# Patient Record
Sex: FEMALE | Race: BLACK OR AFRICAN AMERICAN | Employment: OTHER | URBAN - METROPOLITAN AREA
[De-identification: names, ages, dates, MRNs, and addresses within clinical notes are randomized per-mention and may not be internally consistent; named-entity substitution may affect disease eponyms.]

---

## 2017-04-28 ENCOUNTER — HOSPITAL ENCOUNTER (OUTPATIENT)
Dept: OTHER | Age: 82
Discharge: HOME | End: 2017-04-28
Attending: INTERNAL MEDICINE | Admitting: INTERNAL MEDICINE

## 2017-04-28 LAB
ALBUMIN SERPL-MCNC: 3.9 GM/DL (ref 3.4–5)
ALP BLD-CCNC: 108 IU/L (ref 40–129)
ALT SERPL-CCNC: <5 U/L (ref 10–40)
ANION GAP SERPL CALCULATED.3IONS-SCNC: 15 MMOL/L (ref 4–16)
AST SERPL-CCNC: 10 IU/L (ref 15–37)
BILIRUB SERPL-MCNC: 0.4 MG/DL (ref 0–1)
BUN BLDV-MCNC: 33 MG/DL (ref 6–23)
CALCIUM SERPL-MCNC: 10.4 MG/DL (ref 8.3–10.6)
CHLORIDE BLD-SCNC: 105 MMOL/L (ref 99–110)
CO2: 27 MMOL/L (ref 21–32)
CREAT SERPL-MCNC: 1.1 MG/DL (ref 0.6–1.1)
ERYTHROCYTE SEDIMENTATION RATE: 82 MM/HR (ref 0–30)
FERRITIN: 396 NG/ML (ref 15–150)
FOLATE: >20 NG/ML (ref 3.1–17.5)
GFR AFRICAN AMERICAN: 57 ML/MIN/1.73M2
GFR NON-AFRICAN AMERICAN: 47 ML/MIN/1.73M2
GLUCOSE BLD-MCNC: 75 MG/DL (ref 70–140)
IRON: 53 UG/DL (ref 37–145)
LACTATE DEHYDROGENASE: 151 IU/L (ref 120–246)
PCT TRANSFERRIN: 18 % (ref 10–44)
POTASSIUM SERPL-SCNC: 4.4 MMOL/L (ref 3.5–5.1)
RETICULOCYTE COUNT PCT: 1.7 % (ref 0.2–2.2)
SODIUM BLD-SCNC: 147 MMOL/L (ref 135–145)
TOTAL IRON BINDING CAPACITY: 287 UG/DL (ref 250–450)
TOTAL PROTEIN: 7.4 GM/DL (ref 6.4–8.2)
TSH HIGH SENSITIVITY: 1.36 UIU/ML (ref 0.27–4.2)
UNSATURATED IRON BINDING CAPACITY: 234 UG/DL (ref 110–370)
URIC ACID: 8.2 MG/DL (ref 2.6–6)
VITAMIN B-12: >2000 PG/ML (ref 211–911)

## 2017-05-01 LAB
ALBUMIN ELP: 3.3 GM/DL (ref 3.2–5.6)
ALPHA-1-GLOBULIN: 0.3 GM/DL (ref 0.1–0.4)
ALPHA-2-GLOBULIN: 1 GM/DL (ref 0.4–1.2)
BETA GLOBULIN: 1.6 GM/DL (ref 0.5–1.3)
GAMMA GLOBULIN: 1.2 GM/DL (ref 0.5–1.6)
IMMUNOFIXATION ELECTROPHORESIS 1: NORMAL
TOTAL PROTEIN: 7.4 GM/DL (ref 6.4–8.2)

## 2017-05-04 LAB — T4 TOTAL: 8.91 UG/DL

## 2017-05-05 LAB
KAPPA QUANT FREE LIGHT CHAINS: 2.91
KAPPA/LAMBDA FREE LIGHT CHAIN RATIO: 0.31
LAMBDA FREE LIGHT CHAINS URINE/ VOL: 9.44

## 2017-05-08 ENCOUNTER — HOSPITAL ENCOUNTER (OUTPATIENT)
Dept: OTHER | Age: 82
Discharge: HOME | End: 2017-05-08
Attending: INTERNAL MEDICINE | Admitting: INTERNAL MEDICINE

## 2017-05-08 LAB
Lab: 24 HRS
PROTEIN 24 HOUR URINE: 170 MG/24 HR
VOLUME, (UVOL): 900 MLS

## 2017-05-10 LAB
ALBUMIN, U: 23 %
ALBUMIN, U: DETECTED
ALPHA 1, URINE: DETECTED
ALPHA 2, URINE: DETECTED
ALPHA-1-GLOBULIN, U: 4 %
ALPHA-2-GLOBULIN, U: 11 %
BETA GLOBULIN, U: 24 %
BETA URINE: DETECTED
FREE KAPPA LT CHAINS,UR: 9.78 MG/DL
FREE LAMBDA LT CHAINS,UR: 5.31 MG/DL
GAMMA GLOBULIN, U: 38 %
GAMMA GLOBULIN, URINE: DETECTED
IFE INTERPRETATION:U: ABNORMAL
KAPPA/LAMBDA RATIO,U: 1.84 RATIO
Lab: 24 HR
PROTEIN ELP 24 HOUR URINE: 170 MG/24 HR
PROTEIN, TOTAL URINE: 146 MG/D
URINE FREE KAPPA EXCRETION/DAY: 88.02 MG/D
URINE FREE LAMBDA EXCRETION/DAY: 47.79 MG/D
VOLUME, (UVOL): 900 ML

## 2017-09-29 ENCOUNTER — TELEPHONE (OUTPATIENT)
Dept: CARDIOLOGY CLINIC | Age: 82
End: 2017-09-29

## 2018-04-06 ENCOUNTER — HOSPITAL ENCOUNTER (OUTPATIENT)
Dept: OTHER | Age: 83
Discharge: HOME | End: 2018-04-06
Attending: FAMILY MEDICINE | Admitting: FAMILY MEDICINE

## 2018-04-06 LAB
ALBUMIN SERPL-MCNC: 3.5 GM/DL (ref 3.4–5)
ALP BLD-CCNC: 94 IU/L (ref 40–129)
ALT SERPL-CCNC: <5 U/L (ref 10–40)
ANION GAP SERPL CALCULATED.3IONS-SCNC: 11 MMOL/L (ref 4–16)
AST SERPL-CCNC: 10 IU/L (ref 15–37)
BASOPHILS ABSOLUTE: 0 K/CU MM
BASOPHILS RELATIVE PERCENT: 0.1 % (ref 0–1)
BILIRUB SERPL-MCNC: 0.4 MG/DL (ref 0–1)
BUN BLDV-MCNC: 30 MG/DL (ref 6–23)
CALCIUM SERPL-MCNC: 10.3 MG/DL (ref 8.3–10.6)
CHLORIDE BLD-SCNC: 103 MMOL/L (ref 99–110)
CO2: 28 MMOL/L (ref 21–32)
CREAT SERPL-MCNC: 0.9 MG/DL (ref 0.6–1.1)
DIFFERENTIAL TYPE: ABNORMAL
EOSINOPHILS ABSOLUTE: 0 K/CU MM
EOSINOPHILS RELATIVE PERCENT: 0.1 % (ref 0–3)
GFR AFRICAN AMERICAN: >60 ML/MIN/1.73M2
GFR NON-AFRICAN AMERICAN: 59 ML/MIN/1.73M2
GLUCOSE BLD-MCNC: 86 MG/DL (ref 70–99)
HCT VFR BLD CALC: 32.3 % (ref 37–47)
HEMOGLOBIN: 10 GM/DL (ref 12.5–16)
IMMATURE NEUTROPHIL %: 1.2 % (ref 0–0.43)
LYMPHOCYTES ABSOLUTE: 2 K/CU MM
LYMPHOCYTES RELATIVE PERCENT: 13.7 % (ref 24–44)
MCH RBC QN AUTO: 29.2 PG (ref 27–31)
MCHC RBC AUTO-ENTMCNC: 31 % (ref 32–36)
MCV RBC AUTO: 94.4 FL (ref 78–100)
MONOCYTES ABSOLUTE: 1.8 K/CU MM
MONOCYTES RELATIVE PERCENT: 12.6 % (ref 0–4)
NUCLEATED RBC %: 0 %
PDW BLD-RTO: 13.2 % (ref 11.7–14.9)
PLATELET # BLD: 205 K/CU MM (ref 140–440)
PMV BLD AUTO: 8.4 FL (ref 7.5–11.1)
POTASSIUM SERPL-SCNC: 4.1 MMOL/L (ref 3.5–5.1)
RBC # BLD: 3.42 M/CU MM (ref 4.2–5.4)
SEGMENTED NEUTROPHILS ABSOLUTE COUNT: 10.4 K/CU MM
SEGMENTED NEUTROPHILS RELATIVE PERCENT: 72.3 % (ref 36–66)
SODIUM BLD-SCNC: 142 MMOL/L (ref 135–145)
TOTAL IMMATURE NEUTOROPHIL: 0.17 K/CU MM
TOTAL NUCLEATED RBC: 0 K/CU MM
TOTAL PROTEIN: 6.9 GM/DL (ref 6.4–8.2)
TSH HIGH SENSITIVITY: 1.72 UIU/ML (ref 0.27–4.2)
VITAMIN B-12: >2000 PG/ML (ref 211–911)
WBC # BLD: 14.3 K/CU MM (ref 4–10.5)

## 2020-01-01 ENCOUNTER — HOSPITAL ENCOUNTER (OUTPATIENT)
Age: 85
Setting detail: SPECIMEN
Discharge: HOME | DRG: 208 | End: 2020-12-17
Payer: MEDICARE

## 2020-01-01 ENCOUNTER — APPOINTMENT (OUTPATIENT)
Dept: GENERAL RADIOLOGY | Age: 85
DRG: 208 | End: 2020-01-01
Payer: MEDICARE

## 2020-01-01 ENCOUNTER — HOSPITAL ENCOUNTER (EMERGENCY)
Age: 85
DRG: 208 | End: 2020-12-18
Attending: EMERGENCY MEDICINE | Admitting: INTERNAL MEDICINE
Payer: MEDICARE

## 2020-01-01 VITALS
HEIGHT: 60 IN | TEMPERATURE: 100.6 F | RESPIRATION RATE: 16 BRPM | DIASTOLIC BLOOD PRESSURE: 66 MMHG | OXYGEN SATURATION: 100 % | SYSTOLIC BLOOD PRESSURE: 93 MMHG | HEART RATE: 99 BPM | WEIGHT: 170 LBS | BODY MASS INDEX: 33.38 KG/M2

## 2020-01-01 DIAGNOSIS — U07.1 PNEUMONIA DUE TO COVID-19 VIRUS: ICD-10-CM

## 2020-01-01 DIAGNOSIS — I46.9 CARDIOPULMONARY ARREST (HCC): Primary | ICD-10-CM

## 2020-01-01 DIAGNOSIS — J12.82 PNEUMONIA DUE TO COVID-19 VIRUS: ICD-10-CM

## 2020-01-01 LAB
ALBUMIN SERPL-MCNC: 3.6 GM/DL (ref 3.4–5)
ALP BLD-CCNC: 89 IU/L (ref 40–129)
ALT SERPL-CCNC: <5 U/L (ref 10–40)
ANION GAP SERPL CALCULATED.3IONS-SCNC: 13 MMOL/L (ref 4–16)
AST SERPL-CCNC: 27 IU/L (ref 15–37)
BACTERIA: ABNORMAL /HPF
BASE EXCESS MIXED: 3.3 (ref 0–2.3)
BASE EXCESS: 12 (ref 0–2.4)
BASOPHILS ABSOLUTE: 0 K/CU MM
BASOPHILS RELATIVE PERCENT: 0.1 % (ref 0–1)
BILIRUB SERPL-MCNC: 0.9 MG/DL (ref 0–1)
BILIRUBIN URINE: NEGATIVE MG/DL
BLOOD, URINE: ABNORMAL
BUN BLDV-MCNC: 36 MG/DL (ref 6–23)
CALCIUM SERPL-MCNC: 10.3 MG/DL (ref 8.3–10.6)
CARBON MONOXIDE, BLOOD: 2 % (ref 0–5)
CHLORIDE BLD-SCNC: 101 MMOL/L (ref 99–110)
CLARITY: ABNORMAL
CO2 CONTENT: 19 MMOL/L (ref 19–24)
CO2: 26 MMOL/L (ref 21–32)
COLOR: ABNORMAL
COMMENT: ABNORMAL
COMMENT: ABNORMAL
CREAT SERPL-MCNC: 1.4 MG/DL (ref 0.6–1.1)
DIFFERENTIAL TYPE: ABNORMAL
EOSINOPHILS ABSOLUTE: 0 K/CU MM
EOSINOPHILS RELATIVE PERCENT: 0 % (ref 0–3)
GFR AFRICAN AMERICAN: 43 ML/MIN/1.73M2
GFR NON-AFRICAN AMERICAN: 35 ML/MIN/1.73M2
GLUCOSE BLD-MCNC: 92 MG/DL (ref 70–99)
GLUCOSE, URINE: NEGATIVE MG/DL
HCO3 ARTERIAL: 17.4 MMOL/L (ref 18–23)
HCO3 VENOUS: 29.1 MMOL/L (ref 19–25)
HCT VFR BLD CALC: 30.2 % (ref 37–47)
HEMOGLOBIN: 9 GM/DL (ref 12.5–16)
HYALINE CASTS: 5 /LPF
IMMATURE NEUTROPHIL %: 1.2 % (ref 0–0.43)
KETONES, URINE: ABNORMAL MG/DL
LACTATE: 1.2 MMOL/L (ref 0.4–2)
LEUKOCYTE ESTERASE, URINE: ABNORMAL
LYMPHOCYTES ABSOLUTE: 0.6 K/CU MM
LYMPHOCYTES RELATIVE PERCENT: 4.3 % (ref 24–44)
MCH RBC QN AUTO: 29 PG (ref 27–31)
MCHC RBC AUTO-ENTMCNC: 29.8 % (ref 32–36)
MCV RBC AUTO: 97.4 FL (ref 78–100)
METHEMOGLOBIN ARTERIAL: 1.1 %
MONOCYTES ABSOLUTE: 3.6 K/CU MM
MONOCYTES RELATIVE PERCENT: 24.7 % (ref 0–4)
MUCUS: ABNORMAL HPF
NITRITE URINE, QUANTITATIVE: NEGATIVE
NUCLEATED RBC %: 0 %
O2 SAT, VEN: 83.9 % (ref 50–70)
O2 SATURATION: 96.5 % (ref 96–97)
PCO2 ARTERIAL: 51 MMHG (ref 32–45)
PCO2, VEN: 48 MMHG (ref 38–52)
PDW BLD-RTO: 14.5 % (ref 11.7–14.9)
PH BLOOD: 7.14 (ref 7.34–7.45)
PH VENOUS: 7.39 (ref 7.32–7.42)
PH, URINE: 5 (ref 5–8)
PLATELET # BLD: 184 K/CU MM (ref 140–440)
PMV BLD AUTO: 10.4 FL (ref 7.5–11.1)
PO2 ARTERIAL: 176 MMHG (ref 75–100)
PO2, VEN: 55 MMHG (ref 28–48)
POTASSIUM SERPL-SCNC: 4.3 MMOL/L (ref 3.5–5.1)
PRO-BNP: 8225 PG/ML
PROTEIN UA: 30 MG/DL
RBC # BLD: 3.1 M/CU MM (ref 4.2–5.4)
RBC URINE: 14 /HPF (ref 0–6)
SARS-COV-2, NAAT: DETECTED
SEGMENTED NEUTROPHILS ABSOLUTE COUNT: 10.3 K/CU MM
SEGMENTED NEUTROPHILS RELATIVE PERCENT: 69.7 % (ref 36–66)
SODIUM BLD-SCNC: 140 MMOL/L (ref 135–145)
SOURCE: ABNORMAL
SPECIFIC GRAVITY UA: 1.02 (ref 1–1.03)
SQUAMOUS EPITHELIAL: 13 /HPF
TOTAL IMMATURE NEUTOROPHIL: 0.17 K/CU MM
TOTAL NUCLEATED RBC: 0 K/CU MM
TOTAL PROTEIN: 7.5 GM/DL (ref 6.4–8.2)
TRICHOMONAS: ABNORMAL /HPF
TROPONIN T: 0.13 NG/ML
UROBILINOGEN, URINE: NORMAL MG/DL (ref 0.2–1)
WBC # BLD: 14.7 K/CU MM (ref 4–10.5)
WBC UA: 33 /HPF (ref 0–5)

## 2020-01-01 PROCEDURE — 82805 BLOOD GASES W/O2 SATURATION: CPT

## 2020-01-01 PROCEDURE — 2700000000 HC OXYGEN THERAPY PER DAY

## 2020-01-01 PROCEDURE — 71045 X-RAY EXAM CHEST 1 VIEW: CPT

## 2020-01-01 PROCEDURE — 85025 COMPLETE CBC W/AUTO DIFF WBC: CPT

## 2020-01-01 PROCEDURE — 87086 URINE CULTURE/COLONY COUNT: CPT

## 2020-01-01 PROCEDURE — 87186 SC STD MICRODIL/AGAR DIL: CPT

## 2020-01-01 PROCEDURE — 83880 ASSAY OF NATRIURETIC PEPTIDE: CPT

## 2020-01-01 PROCEDURE — 93010 ELECTROCARDIOGRAM REPORT: CPT | Performed by: INTERNAL MEDICINE

## 2020-01-01 PROCEDURE — 99291 CRITICAL CARE FIRST HOUR: CPT

## 2020-01-01 PROCEDURE — 82803 BLOOD GASES ANY COMBINATION: CPT

## 2020-01-01 PROCEDURE — 96375 TX/PRO/DX INJ NEW DRUG ADDON: CPT

## 2020-01-01 PROCEDURE — 2500000003 HC RX 250 WO HCPCS: Performed by: EMERGENCY MEDICINE

## 2020-01-01 PROCEDURE — U0002 COVID-19 LAB TEST NON-CDC: HCPCS

## 2020-01-01 PROCEDURE — 87077 CULTURE AEROBIC IDENTIFY: CPT

## 2020-01-01 PROCEDURE — 5A12012 PERFORMANCE OF CARDIAC OUTPUT, SINGLE, MANUAL: ICD-10-PCS | Performed by: EMERGENCY MEDICINE

## 2020-01-01 PROCEDURE — 5A1935Z RESPIRATORY VENTILATION, LESS THAN 24 CONSECUTIVE HOURS: ICD-10-PCS | Performed by: EMERGENCY MEDICINE

## 2020-01-01 PROCEDURE — 6360000002 HC RX W HCPCS

## 2020-01-01 PROCEDURE — 2500000003 HC RX 250 WO HCPCS

## 2020-01-01 PROCEDURE — 83605 ASSAY OF LACTIC ACID: CPT

## 2020-01-01 PROCEDURE — 31500 INSERT EMERGENCY AIRWAY: CPT

## 2020-01-01 PROCEDURE — 84484 ASSAY OF TROPONIN QUANT: CPT

## 2020-01-01 PROCEDURE — 92950 HEART/LUNG RESUSCITATION CPR: CPT

## 2020-01-01 PROCEDURE — 80053 COMPREHEN METABOLIC PANEL: CPT

## 2020-01-01 PROCEDURE — 93005 ELECTROCARDIOGRAM TRACING: CPT | Performed by: EMERGENCY MEDICINE

## 2020-01-01 PROCEDURE — 87040 BLOOD CULTURE FOR BACTERIA: CPT

## 2020-01-01 PROCEDURE — 2580000003 HC RX 258: Performed by: EMERGENCY MEDICINE

## 2020-01-01 PROCEDURE — 96365 THER/PROPH/DIAG IV INF INIT: CPT

## 2020-01-01 PROCEDURE — 6360000002 HC RX W HCPCS: Performed by: EMERGENCY MEDICINE

## 2020-01-01 PROCEDURE — 3E043XZ INTRODUCTION OF VASOPRESSOR INTO CENTRAL VEIN, PERCUTANEOUS APPROACH: ICD-10-PCS | Performed by: EMERGENCY MEDICINE

## 2020-01-01 PROCEDURE — 36600 WITHDRAWAL OF ARTERIAL BLOOD: CPT

## 2020-01-01 PROCEDURE — 94761 N-INVAS EAR/PLS OXIMETRY MLT: CPT

## 2020-01-01 PROCEDURE — 6370000000 HC RX 637 (ALT 250 FOR IP): Performed by: EMERGENCY MEDICINE

## 2020-01-01 PROCEDURE — 2580000003 HC RX 258

## 2020-01-01 PROCEDURE — 0BH18EZ INSERTION OF ENDOTRACHEAL AIRWAY INTO TRACHEA, VIA NATURAL OR ARTIFICIAL OPENING ENDOSCOPIC: ICD-10-PCS | Performed by: EMERGENCY MEDICINE

## 2020-01-01 PROCEDURE — 99285 EMERGENCY DEPT VISIT HI MDM: CPT

## 2020-01-01 PROCEDURE — 81001 URINALYSIS AUTO W/SCOPE: CPT

## 2020-01-01 PROCEDURE — 96374 THER/PROPH/DIAG INJ IV PUSH: CPT

## 2020-01-01 PROCEDURE — 1200000000 HC SEMI PRIVATE

## 2020-01-01 PROCEDURE — 05HP33Z INSERTION OF INFUSION DEVICE INTO RIGHT EXTERNAL JUGULAR VEIN, PERCUTANEOUS APPROACH: ICD-10-PCS | Performed by: EMERGENCY MEDICINE

## 2020-01-01 RX ORDER — ONDANSETRON 2 MG/ML
4 INJECTION INTRAMUSCULAR; INTRAVENOUS EVERY 6 HOURS PRN
Status: CANCELLED | OUTPATIENT
Start: 2020-01-01

## 2020-01-01 RX ORDER — DEXTROSE MONOHYDRATE 25 G/50ML
INJECTION, SOLUTION INTRAVENOUS DAILY PRN
Status: COMPLETED | OUTPATIENT
Start: 2020-01-01 | End: 2020-01-01

## 2020-01-01 RX ORDER — METHYLPREDNISOLONE SODIUM SUCCINATE 125 MG/2ML
40 INJECTION, POWDER, LYOPHILIZED, FOR SOLUTION INTRAMUSCULAR; INTRAVENOUS EVERY 12 HOURS
Status: CANCELLED | OUTPATIENT
Start: 2020-01-01 | End: 2020-12-27

## 2020-01-01 RX ORDER — ACETAMINOPHEN 325 MG/1
650 TABLET ORAL EVERY 6 HOURS PRN
Status: CANCELLED | OUTPATIENT
Start: 2020-01-01

## 2020-01-01 RX ORDER — DOPAMINE HYDROCHLORIDE 160 MG/100ML
5 INJECTION, SOLUTION INTRAVENOUS CONTINUOUS
Status: DISCONTINUED | OUTPATIENT
Start: 2020-01-01 | End: 2020-12-18 | Stop reason: HOSPADM

## 2020-01-01 RX ORDER — ACETAMINOPHEN 650 MG/1
650 SUPPOSITORY RECTAL ONCE
Status: COMPLETED | OUTPATIENT
Start: 2020-01-01 | End: 2020-01-01

## 2020-01-01 RX ORDER — POLYETHYLENE GLYCOL 3350 17 G/17G
17 POWDER, FOR SOLUTION ORAL DAILY PRN
Status: CANCELLED | OUTPATIENT
Start: 2020-01-01

## 2020-01-01 RX ORDER — ACETAMINOPHEN 650 MG/1
650 SUPPOSITORY RECTAL EVERY 6 HOURS PRN
Status: CANCELLED | OUTPATIENT
Start: 2020-01-01

## 2020-01-01 RX ORDER — 0.9 % SODIUM CHLORIDE 0.9 %
30 INTRAVENOUS SOLUTION INTRAVENOUS ONCE
Status: COMPLETED | OUTPATIENT
Start: 2020-01-01 | End: 2020-01-01

## 2020-01-01 RX ORDER — SODIUM CHLORIDE 0.9 % (FLUSH) 0.9 %
10 SYRINGE (ML) INJECTION PRN
Status: CANCELLED | OUTPATIENT
Start: 2020-01-01

## 2020-01-01 RX ORDER — ASPIRIN 600 MG/1
300 SUPPOSITORY RECTAL ONCE
Status: COMPLETED | OUTPATIENT
Start: 2020-01-01 | End: 2020-01-01

## 2020-01-01 RX ORDER — PROMETHAZINE HYDROCHLORIDE 25 MG/1
12.5 TABLET ORAL EVERY 6 HOURS PRN
Status: CANCELLED | OUTPATIENT
Start: 2020-01-01

## 2020-01-01 RX ORDER — SODIUM CHLORIDE 0.9 % (FLUSH) 0.9 %
10 SYRINGE (ML) INJECTION EVERY 12 HOURS SCHEDULED
Status: CANCELLED | OUTPATIENT
Start: 2020-01-01

## 2020-01-01 RX ORDER — EPINEPHRINE 0.1 MG/ML
SYRINGE (ML) INJECTION DAILY PRN
Status: COMPLETED | OUTPATIENT
Start: 2020-01-01 | End: 2020-01-01

## 2020-01-01 RX ORDER — CHLORHEXIDINE GLUCONATE 0.12 MG/ML
15 RINSE ORAL 2 TIMES DAILY
Status: CANCELLED | OUTPATIENT
Start: 2020-01-01

## 2020-01-01 RX ORDER — SODIUM CHLORIDE 9 MG/ML
INJECTION, SOLUTION INTRAVENOUS CONTINUOUS
Status: CANCELLED | OUTPATIENT
Start: 2020-01-01

## 2020-01-01 RX ADMIN — ACETAMINOPHEN 650 MG: 650 SUPPOSITORY RECTAL at 20:24

## 2020-01-01 RX ADMIN — Medication 50 MEQ: at 21:06

## 2020-01-01 RX ADMIN — EPINEPHRINE 1 MG: 0.1 INJECTION, SOLUTION ENDOTRACHEAL; INTRACARDIAC; INTRAVENOUS at 21:00

## 2020-01-01 RX ADMIN — SODIUM CHLORIDE 2313 ML: 9 INJECTION, SOLUTION INTRAVENOUS at 19:17

## 2020-01-01 RX ADMIN — EPINEPHRINE 1 MG: 0.1 INJECTION, SOLUTION ENDOTRACHEAL; INTRACARDIAC; INTRAVENOUS at 21:03

## 2020-01-01 RX ADMIN — Medication 5 MCG/MIN: at 19:00

## 2020-01-01 RX ADMIN — EPINEPHRINE 1 MG: 0.1 INJECTION, SOLUTION ENDOTRACHEAL; INTRACARDIAC; INTRAVENOUS at 18:38

## 2020-01-01 RX ADMIN — DOPAMINE HYDROCHLORIDE IN DEXTROSE 5 MCG/KG/MIN: 1.6 INJECTION, SOLUTION INTRAVENOUS at 20:46

## 2020-01-01 RX ADMIN — EPINEPHRINE 1 MG: 0.1 INJECTION, SOLUTION ENDOTRACHEAL; INTRACARDIAC; INTRAVENOUS at 21:06

## 2020-01-01 RX ADMIN — ASPIRIN 300 MG: 600 SUPPOSITORY RECTAL at 20:24

## 2020-01-01 RX ADMIN — DEXTROSE MONOHYDRATE 25 G: 25 INJECTION, SOLUTION INTRAVENOUS at 18:41

## 2020-01-01 RX ADMIN — CEFEPIME HYDROCHLORIDE 2 G: 2 INJECTION, POWDER, FOR SOLUTION INTRAVENOUS at 20:23

## 2020-01-01 RX ADMIN — Medication 50 MEQ: at 21:03

## 2020-12-17 PROBLEM — I46.9 CARDIAC ARREST (HCC): Status: ACTIVE | Noted: 2020-01-01

## 2020-12-17 NOTE — ED PROVIDER NOTES
EMERGENCY DEPARTMENT ENCOUNTER      CHIEF COMPLAINT:   Fever  Shortness of breath    CRITICAL CARE NOTE:  There was a high probability of clinically significant life-threatening deterioration of the patient's condition requiring my urgent intervention. Total critical care time is 55 minutes  This includes vital sign monitoring, pulse oximetry monitoring, telemetry monitoring, clinical response to the IV medications, reviewing the nursing notes, consultation time, dictation/documetation time. (This time excludes time spent performing procedures). HPI: Tram Oglesby is a 80 y.o. female who presents to the Emergency Department complaining of a fever and shortness of breath. The patient states that the symptoms started a few days ago. She was seen by her primary care physician today who thought that she might have pneumonia. Her shortness of breath became worse and so she came in for evaluation. .  The shortness of breath is constant. It is associated with a nonproductive cough. It is worse with exertion and better with rest.. The patient denies tobacco use. There is no known history of DVT or PE. He does not use oxygen at home. The patient denies leg pain. She has chronic leg swelling secondary to lymphedema. Patient denies any other complaints. REVIEW OF SYSTEMS:  CONSTITUTIONAL: See HPI  EYES:  Denies photophobia or discharge  ENT:  Denies sore throat or ear pain  CARDIOVASCULAR: Denies chest pain or palpitations  RESPIRATORY:  Complains of cough and shortness of breath  GI:  Denies abdominal pain, nausea, vomiting, or diarrhea  MUSCULOSKELETAL:  Denies back pain  SKIN:  No rash  NEUROLOGIC:  Denies headache, focal weakness or sensory changes  All systems negative except as marked. \"Remaining review of systems reviewed and negative. I have reviewed the nursing triage documentation and agree unless otherwise noted below. \"      PAST MEDICAL HISTORY:   Past Medical History:   Diagnosis Date    H/O cardiovascular stress test 1/2014    EF70% normal study    H/O echocardiogram 1/2014    EF55% mild MR, TR, mild pulm htn, mild A stenosis    Hx of cardiovascular stress test     08/20/2008=normal pattern of perfusion in all myocardial regions, resting EF 70%, no inducible myocardial ischemia; 04/2003    Hx of echocardiogram     08/19/2008= abnormal lv diastolic function stage 1, EF >55%, compared to prior study, there is no sig change; 04/2005=EF 55-60%; 03/2003    Hyperlipidemia     Hypertension     Lymphedema of leg     bilateral lower legs    Parkinson's disease ([facility] [address]     PVD (peripheral vascular disease) ([facility] [address]     Spinal stenosis        CURRENT MEDICATIONS:   Home medications reviewed. SURGICAL HISTORY:   Past Surgical History:   Procedure Laterality Date    APPENDECTOMY      BONE TUMOR RESECTION, KNEE      CHOLECYSTECTOMY      HYSTERECTOMY      TONSILLECTOMY AND ADENOIDECTOMY         FAMILY HISTORY:   No family history on file. SOCIAL HISTORY:   Social History     Socioeconomic History    Marital status:      Spouse name: Not on file    Number of children: Not on file    Years of education: Not on file    Highest education level: Not on file   Occupational History    Not on file   Social Needs    Financial resource strain: Not on file    Food insecurity     Worry: Not on file     Inability: Not on file    Transportation needs     Medical: Not on file     Non-medical: Not on file   Tobacco Use    Smoking status: Never Smoker    Smokeless tobacco: Never Used    Tobacco comment: Reviewed 9/29/2016   Substance and Sexual Activity    Alcohol use:  Yes     Alcohol/week: 0.0 standard drinks     Comment: rarely    Drug use: No    Sexual activity: Not on file   Lifestyle    Physical activity     Days per week: Not on file     Minutes per session: Not on file    Stress: Not on file   Relationships    Social connections     Talks on phone: Not on file     Gets together: Not on file     Attends Synagogue service: Not on file     Active member of club or organization: Not on file     Attends meetings of clubs or organizations: Not on file     Relationship status: Not on file    Intimate partner violence     Fear of current or ex partner: Not on file     Emotionally abused: Not on file     Physically abused: Not on file     Forced sexual activity: Not on file   Other Topics Concern    Not on file   Social History Narrative    Not on file       ALLERGIES: Pcn [penicillins] and Sulfa antibiotics    PHYSICAL EXAM:  VITAL SIGNS:   ED Triage Vitals [12/17/20 1810]   Enc Vitals Group      BP       Pulse       Resp       Temp       Temp src       SpO2       Weight 170 lb (77.1 kg)      Height 5' (1.524 m)      Head Circumference       Peak Flow       Pain Score       Pain Loc       Pain Edu? Excl. in [address]? Constitutional:  Non-toxic appearance  HENT: Normocephalic, Atraumatic, Bilateral external ears normal, Oropharynx moist, No oral exudates, Nose normal.  Eyes:  PERRL, Conjunctiva normal, No discharge. Neck: Normal range of motion, No tenderness, Supple, No stridor, No lymphadenopathy. Cardiovascular:  Tachycardic, Irregular rhythm  Pulmonary/Chest: Mildly dyspneic, no wheezing, intermittent cough  Abdomen:   Bowel sounds normal, Soft, No tenderness, No masses, No pulsatile masses  Back:  No tenderness, No CVA tenderness  Extremities:  Normal range of motion, Intact distal pulses, bilateral leg swelling, no tenderness  Skin:  Warm, Dry, No erythema, No rash    EKG Interpretation  Interpreted by me  Compared to 10/12/19  Rhythm: atrial fibrillation - rapid  Rate: tachycardia 112  Axis: left  Ectopy: none  Conduction: irregularly irregular  ST Segments: ST depression in leads 1, 2, aVL, V4-V6  T Waves: no acute change  Clinical Impression: atrial fibrillation with rapid ventricular rate, ST depression in leads 1, 2, aVL, V4-V6    Cardiac Monitor Strip Interpretation  Interpreted by me  Monitor strip interpreted for greater than 10 seconds  Rhythm: atrial fibrillation with RVR  Rate: tachycardic  Ectopy: none  ST Segments: normal      Radiology / Procedures:  Intubation Procedure Note (I supervised the respiratory therapist, Chinedu Crockett, performed the intubation)  Indication: Respiratory failure    Consent: Unable to be obtained due to the emergent nature of this procedure. Medications Used: None    Procedure: The patient was placed in the appropriate position. Cricoid pressure was not required. Intubation was performed by direct laryngoscopy using a laryngoscope and a 7.5 cuffed endotracheal tube. The cuff was then inflated and the tube was secured appropriately at a distance of 23 cm to the dental ridge. Initial confirmation of placement included bilateral breath sounds, an end tidal CO2 detector, absence of sounds over the stomach, tube fogging, adequate chest rise and adequate pulse oximetry reading. A chest x-ray to verify correct placement of the tube has been ordered but is still pending. The patient tolerated the procedure well. Complications: None    Central Line Placement Procedure Note  Indication: centrally administered medications  Consent: Unable to be obtained due to the emergent nature of this procedure. Procedure: The patient was positioned appropriately and the skin over the right femoral vein was prepped with betadine and draped in a sterile fashion. Local anesthesia was not performed due to the emergent nature of this procedure. A large bore needle was used in an attempt to identify the vein. However, this was unsuccessful despite multiple attempts. The patient tolerated the procedure well. Complications: None    Please see central line procedure note of [facility].     XR CHEST PORTABLE (Final result)  Result time 12/17/20 19:22:05  Final result by Mariela Benoit MD (12/17/20 19:22:05)                Impression:    Endotracheal tube approximately 17 mm above the shaheen     NG courses into the abdomen     Bilateral parahilar infiltrates could represent pneumonia             Narrative:    EXAMINATION:   ONE XRAY VIEW OF THE CHEST     12/17/2020 6:57 pm     COMPARISON:   10/20/2015     HISTORY:   ORDERING SYSTEM PROVIDED HISTORY: post intubation, confirm tube placement   TECHNOLOGIST PROVIDED HISTORY:   Reason for exam:->post intubation, confirm tube placement   Reason for Exam: post intubation, confirm tube placement   Acuity: Acute   Type of Exam: Initial     FINDINGS:   Endotracheal tube approximately 17 mm above the shaheen.  NG courses into the   abdomen.  The heart size is within normal limits.  Bilateral parahilar   opacities.                Labs Reviewed   CBC WITH AUTO DIFFERENTIAL - Abnormal; Notable for the following components:       Result Value    WBC 14.7 (*)     RBC 3.10 (*)     Hemoglobin 9.0 (*)     Hematocrit 30.2 (*)     MCHC 29.8 (*)     Segs Relative 69.7 (*)     Lymphocytes % 4.3 (*)     Monocytes % 24.7 (*)     Immature Neutrophil % 1.2 (*)     All other components within normal limits   COMPREHENSIVE METABOLIC PANEL - Abnormal; Notable for the following components:    BUN 36 (*)     CREATININE 1.4 (*)     ALT <5 (*)     GFR Non- 35 (*)     GFR  43 (*)     All other components within normal limits   TROPONIN - Abnormal; Notable for the following components:    Troponin T 0.127 (*)     All other components within normal limits   BRAIN NATRIURETIC PEPTIDE - Abnormal; Notable for the following components:    Pro-BNP 8,225 (*)     All other components within normal limits   COVID-19 - Abnormal; Notable for the following components:    SARS-CoV-2, NAAT DETECTED (*)     All other components within normal limits   BLOOD GAS, VENOUS - Abnormal; Notable for the following components:    pO2, Hector 55 (*)     Base Exc, Mixed 3.3 (*)     HCO3, Venous 29.1 (*)     O2 Sat, Hector 83.9 (*)     All other components within normal limits   BLOOD GAS, ARTERIAL - Abnormal; Notable for the following components:    pH, Bld 7.14 (*)     pCO2, Arterial 51.0 (*)     pO2, Arterial 176 (*)     Base Excess 12 (*)     HCO3, Arterial 17.4 (*)     All other components within normal limits   CULTURE, BLOOD 2    Narrative:     SETUP DATE/TIME:  12/18/2020 0655   CULTURE, BLOOD 1    Narrative:     SETUP DATE/TIME:  12/18/2020 0655   LACTIC ACID, PLASMA       ED COURSE & MEDICAL DECISION MAKING:  Pertinent Labs & Imaging studies reviewed. (See chart for details)  The patient was initially placed in a hallway bed and I examined her there. On exam, the patient is febrile but does not appear toxic. She is mildly dyspneic. She is hypoixic in the mid 80s on room air and was placed on supplemental oxygen with improvement. She is otherwise hemodynamically stable and neurologically intact. An EKG, chest x-ray and labs were ordered. As the nurse was setting up to do the EKG, the patient was noted to slump over and stopped breathing. A pulse could not be found and ACLS was initiated. The patient was moved to trauma room 3. She was intubated per the respiratory therapist as above. She was given IV epinephrine, IV dextrose and 1 amp of bicarb and had ROSC. She was noted to be hypotensive and was started on Levophed. I attempted to place a central line without success. A central line was successfully placed per the LISA. An EKG was obtained and shows atrial fibrillation with ST depression as above. There is no ST elevation. Chest x-ray shows appropriate placement of the ET tube. The NG tube courses into the abdomen. There are bilateral perihilar infiltrates that could represent pneumonia. Labs are significant for leukocytosis, anemia, renal insufficiency, an elevated troponin, and elevated BNP and a positive Covid test.  The patient was treated with IV normal saline, empiric vancomycin and cefepime and rectal aspirin.   The Arctic sun protocol was initiated. I suspect that the patient has pneumonia due to Covid and had a cardiac arrest.  It is not exactly clear why she went into cardiac arrest although it appears that she was having a non-ST elevated MI as well. .  I spoke with the patient's family and updated them. Questions were answered to the best of my ability. I spoke with the hospitalist who will admit the patient. While awaiting an inpatient bed, the patient was noted to become bradycardic and pulses were unable to be palpated. ACLS was initiated. Despite multiple rounds of epinephrine and bicarb, the patient could not be resuscitated. Time of death 21:08. Clinical Impression:  1. Cardiopulmonary arrest ([facility] [address]    2. Pneumonia due to COVID-19 virus      Comment: Please note this report has been produced using speech recognition software and may contain errors related to that system including errors in grammar, punctuation, and spelling, as well as words and phrases that may be inappropriate. If there are any questions or concerns please feel free to contact the dictating provider for clarification.         Naya Pierre MD  12/23/20 [id number]

## 2020-12-17 NOTE — ED NOTES
[facility], daughter would like called with updates. [phone].      Daniela Vallejo, RN  12/17/20 7747

## 2020-12-17 NOTE — Clinical Note
Patient Class: Inpatient [101]   REQUIRED: Diagnosis: Cardiac arrest ([facility] [address] Laila.Michaelle. 5. ICD-9-CM]   Estimated Length of Stay: Estimated stay of more than 2 midnights   Admitting Provider: Willie Boles [[id number]]   Telemetry Bed Required?: Yes

## 2020-12-18 LAB
EKG ATRIAL RATE: 125 BPM
EKG DIAGNOSIS: NORMAL
EKG Q-T INTERVAL: 338 MS
EKG QRS DURATION: 100 MS
EKG QTC CALCULATION (BAZETT): 461 MS
EKG R AXIS: -39 DEGREES
EKG T AXIS: 18 DEGREES
EKG VENTRICULAR RATE: 112 BPM

## 2020-12-18 NOTE — ED NOTES
Partial dentures given to Karin Chicas were discarded by nursing staff per family request.     Grey Leyva RN  12/18/20 4875

## 2020-12-18 NOTE — CONSULTS
Hospitalist Consult Note      Name:  Karina Lopez /Age/Sex: 1931  (80 y.o. female)   MRN & CSN:  [id number] & [id number] Admission Date/Time: 2020  6:09 PM   Location:  [place] [address] PCP: Patricia Salinas MD       Hospital Day: 2    Assessment and Plan:   Karina Lopez is a 80 y.o.  female  who presents with Cardiac arrest [facility]    Cardiac Arrest  · Could be from COVID-19/acute respiratory failure/NSTEMI ? · ACLS, status post intubation, initially with return of spontaneous circulation. · Patient however  after 2nd arrest before being taken to the floor.      Acute Hypoxic Respiratory Failure:   · due to COVID-19 pneumonia. · Intubated, sedated  · CXR post intubation with bilateral parahilar infiltrates  · ABG with respiratory and metabolic acidosis  · Continue Duonebs. Pulse oximetry continuously. Diet No diet orders on file   DVT Prophylaxis [] Lovenox, []  Heparin, [] SCDs, [] Warfarin  [] NOAC     GI Prophylaxis [] PPI,  [] H2 Blocker,  [] Carafate,  [] Diet/Tube Feeds   Code Status Prior   MDM [] Low, [] Moderate,[]  High     History of Present Illness:     Chief Complaint: Cardiac arrest ([facility] [address]  Karina Lopez is a 80 y.o.  female, with past medical history significant for hypertension, peripheral vascular disease, Parkinson's disease, hyperlipidemia, who presented to the ED from home due to fever and shortness of breath. The present condition started few days prior to admission as worsening shortness of breath and productive cough. Per chart, she was recently seen by her primary care physician who diagnosed her with pneumonia. She came to the emergency department for progressive shortness of breath. While at the emergency room, she became unresponsive. ACLS was initiated. She was also intubated. Has return of spontaneous circulation. She was found to be hypotensive and started on vasopressor. She again became bradycardic and lost pulse.   ACLS was

## 2020-12-18 NOTE — ED NOTES
Patient arrived to TR03 in cardiac arrest, CPR actively in progress.       Simon Ramirez RN  12/17/20 1918

## 2020-12-18 NOTE — PROGRESS NOTES
Pt intubated @ 1845 by Teddy Argueta, RRT. One attempt to intubate made. Pt preoxygenated by bag mask prior to intubation. Pt intubated with size 7.5 tube, landmark 23 cm @ Lip. Teeth and dentures checked/removed. Tube placement confirmed by bilateral breath sounds, tube fogging, colorimetric etco2. Patient placed on zoll ventilator. No complications.

## 2020-12-20 NOTE — ED PROVIDER NOTES
Procedure Note - Central Line:  The benefits, risks, and alternatives of central venous access were unable to be discussed secondary to the emergent nature of the procedure. Dane Zuniga was prepped and draped in standard bedside fashion in the right femoral groin area. Physical landmarks with ultrasound guidance was used to locate the central vein. Local anesthesia with 4ml of 1% lidocaine was injected. After several attempts unable to cannulate the vein, did have attending physician at bedside, Dr. Kale Hermosillo. Attempted several times without success of cannulating the vein. After several attempts we did stop and attempted to relocate, see further notes. My participation in patient encounter was procedure only. Please see physician note for complete patient encounter and disposition.      Mariela Escobar 411, PA  12/20/20 7673

## 2020-12-21 LAB
CULTURE: ABNORMAL
CULTURE: ABNORMAL
Lab: ABNORMAL
SPECIMEN: ABNORMAL

## 2020-12-23 LAB
CULTURE: NORMAL
CULTURE: NORMAL
Lab: NORMAL
Lab: NORMAL
SPECIMEN: NORMAL
SPECIMEN: NORMAL